# Patient Record
Sex: FEMALE | Race: WHITE | NOT HISPANIC OR LATINO | Employment: FULL TIME | URBAN - METROPOLITAN AREA
[De-identification: names, ages, dates, MRNs, and addresses within clinical notes are randomized per-mention and may not be internally consistent; named-entity substitution may affect disease eponyms.]

---

## 2021-01-24 ENCOUNTER — APPOINTMENT (OUTPATIENT)
Dept: RADIOLOGY | Facility: CLINIC | Age: 51
End: 2021-01-24
Payer: OTHER MISCELLANEOUS

## 2021-01-24 DIAGNOSIS — M25.511 ACUTE PAIN OF RIGHT SHOULDER: ICD-10-CM

## 2021-01-24 PROCEDURE — 72040 X-RAY EXAM NECK SPINE 2-3 VW: CPT

## 2021-03-12 ENCOUNTER — OFFICE VISIT (OUTPATIENT)
Dept: FAMILY MEDICINE CLINIC | Facility: CLINIC | Age: 51
End: 2021-03-12
Payer: COMMERCIAL

## 2021-03-12 VITALS
BODY MASS INDEX: 42.27 KG/M2 | WEIGHT: 263 LBS | HEIGHT: 66 IN | TEMPERATURE: 98 F | DIASTOLIC BLOOD PRESSURE: 94 MMHG | HEART RATE: 76 BPM | RESPIRATION RATE: 20 BRPM | SYSTOLIC BLOOD PRESSURE: 166 MMHG

## 2021-03-12 DIAGNOSIS — Z12.31 ENCOUNTER FOR SCREENING MAMMOGRAM FOR MALIGNANT NEOPLASM OF BREAST: ICD-10-CM

## 2021-03-12 DIAGNOSIS — M54.12 CERVICAL RADICULOPATHY: Primary | ICD-10-CM

## 2021-03-12 DIAGNOSIS — R93.89 ABNORMAL IMAGING OF THYROID: ICD-10-CM

## 2021-03-12 DIAGNOSIS — Z13.6 SCREENING FOR CARDIOVASCULAR CONDITION: ICD-10-CM

## 2021-03-12 DIAGNOSIS — D64.9 ANEMIA, UNSPECIFIED TYPE: ICD-10-CM

## 2021-03-12 DIAGNOSIS — I10 ESSENTIAL HYPERTENSION: ICD-10-CM

## 2021-03-12 PROCEDURE — 99203 OFFICE O/P NEW LOW 30 MIN: CPT | Performed by: NURSE PRACTITIONER

## 2021-03-12 PROCEDURE — 4004F PT TOBACCO SCREEN RCVD TLK: CPT | Performed by: NURSE PRACTITIONER

## 2021-03-12 PROCEDURE — 3725F SCREEN DEPRESSION PERFORMED: CPT | Performed by: NURSE PRACTITIONER

## 2021-03-12 PROCEDURE — 3008F BODY MASS INDEX DOCD: CPT | Performed by: NURSE PRACTITIONER

## 2021-03-12 RX ORDER — VALSARTAN 80 MG/1
80 TABLET ORAL DAILY
Qty: 30 TABLET | Refills: 3 | Status: SHIPPED | OUTPATIENT
Start: 2021-03-12 | End: 2021-04-16 | Stop reason: SDUPTHER

## 2021-03-12 RX ORDER — METHOCARBAMOL 750 MG/1
750 TABLET, FILM COATED ORAL 2 TIMES DAILY
Qty: 60 TABLET | Refills: 1 | Status: SHIPPED | OUTPATIENT
Start: 2021-03-12 | End: 2021-04-21 | Stop reason: SDUPTHER

## 2021-03-12 NOTE — ASSESSMENT & PLAN NOTE
Pt reports she had an iron deficiency in the past   Was on Iron supplementation during her pregnancies  Menstrual cycles were previously regular, and are now becoming irregular

## 2021-03-12 NOTE — ASSESSMENT & PLAN NOTE
Referral given for pain management to discuss injections  Continue with Robaxin for now    Advised Aleve may raise her BP and should be taken sparingly

## 2021-03-12 NOTE — PROGRESS NOTES
Assessment/Plan:    1  Cervical radiculopathy  Assessment & Plan:  Referral given for pain management to discuss injections  Continue with Robaxin for now  Advised Jony may raise her BP and should be taken sparingly    Orders:  -     Ambulatory referral to Pain Management; Future  -     methocarbamol (ROBAXIN) 750 mg tablet; Take 1 tablet (750 mg total) by mouth 2 (two) times a day    2  Encounter for screening mammogram for malignant neoplasm of breast  -     Mammo screening bilateral w 3d & cad; Future; Expected date: 03/12/2021    3  Abnormal imaging of thyroid  Assessment & Plan: Will check labs and thyroid US    Orders:  -     US thyroid; Future; Expected date: 03/12/2021  -     Thyroid Antibodies Panel; Future  -     TSH, 3rd generation; Future  -     T4, free; Future  -     Thyroid Antibodies Panel  -     TSH, 3rd generation  -     T4, free    4  Anemia, unspecified type  Assessment & Plan:  Pt reports she had an iron deficiency in the past   Was on Iron supplementation during her pregnancies  Menstrual cycles were previously regular, and are now becoming irregular  Orders:  -     Fe+TIBC+Zhang; Future  -     Fe+TIBC+Zhang    5  Screening for cardiovascular condition  -     Comprehensive metabolic panel; Future  -     CBC and differential; Future  -     Lipid panel; Future  -     Comprehensive metabolic panel  -     CBC and differential  -     Lipid panel    6  Essential hypertension  Assessment & Plan: Will start on Valsartan  Labs ordered to be done prior to f/u visit    Orders:  -     valsartan (DIOVAN) 80 mg tablet; Take 1 tablet (80 mg total) by mouth daily    BMI Counseling: Body mass index is 42 45 kg/m²  The BMI is above normal  Nutrition recommendations include consuming healthier snacks  Exercise recommendations include moderate physical activity 150 minutes/week  Tobacco Cessation Counseling: Tobacco cessation counseling was provided   The patient is sincerely urged to quit consumption of tobacco  She is not ready to quit tobacco  Patient refused medication  There are no Patient Instructions on file for this visit  Return in about 1 month (around 4/12/2021) for Annual physical     Subjective:      Patient ID: Vijay Rodriguez is a 52 y o  female  Chief Complaint   Patient presents with   [address]     NP  DIscuss multiple issues [facility] LPN       She had a WC injury on 1/16 involving her neck  MRI Was done and was told she had a preexisting condition and they would not treat her  She was treated with Cyclobenzaprine and Prednisone  Felt both were helpful, but felt drosy with the Cyclobenzaprine  Was then switched to the Robaxin, which has been helping as well  Also did PT , felt this made her more sore  She has paresthesias and weakness in her fingers  She reports she has never had neck pain in the past prior to her injury  She did have discectomy on her L4-5 in 2012  She denies any current low back pain  MRI with moderate to severe stenosis  Also some abnormal findings in her thyroid  History of HTN, she took Lisinopril for a very short time a few years back  BP has been elevated at recent visits, which she has been attributing to pain  The following portions of the patient's history were reviewed and updated as appropriate: allergies, current medications, past family history, past medical history, past social history, past surgical history and problem list     Review of Systems   Constitutional: Negative  Respiratory: Negative  Cardiovascular: Negative  Gastrointestinal: Negative  Musculoskeletal: Positive for neck pain  Neurological: Positive for weakness and numbness           Current Outpatient Medications   Medication Sig Dispense Refill    methocarbamol (ROBAXIN) 750 mg tablet Take 1 tablet (750 mg total) by mouth 2 (two) times a day 60 tablet 1    valsartan (DIOVAN) 80 mg tablet Take 1 tablet (80 mg total) by mouth daily 30 tablet 3     No current facility-administered medications for this visit  Objective:    /94   Pulse 76   Temp 98 °F (36 7 °C)   Resp 20   Ht 5' 6" (1 676 m)   Wt 119 kg (263 lb)   LMP 02/23/2021 (Exact Date)   BMI 42 45 kg/m²        Physical Exam  Vitals signs and nursing note reviewed  Constitutional:       Appearance: She is well-developed  Cardiovascular:      Rate and Rhythm: Normal rate and regular rhythm  Pulses: Normal pulses  Heart sounds: Normal heart sounds  No murmur  Pulmonary:      Effort: Pulmonary effort is normal       Breath sounds: Normal breath sounds  Musculoskeletal:      Cervical back: She exhibits normal range of motion and no tenderness  Skin:     General: Skin is warm and dry  Capillary Refill: Capillary refill takes less than 2 seconds  Neurological:      Mental Status: She is alert  Sensory: Sensory deficit (bilateral fingers) present     Psychiatric:         Mood and Affect: Mood normal          Behavior: Behavior normal                 [place] STEFFI Medina

## 2021-04-09 ENCOUNTER — HOSPITAL ENCOUNTER (OUTPATIENT)
Dept: RADIOLOGY | Facility: HOSPITAL | Age: 51
Discharge: HOME | End: 2021-04-09
Payer: COMMERCIAL

## 2021-04-09 DIAGNOSIS — R93.89 ABNORMAL IMAGING OF THYROID: ICD-10-CM

## 2021-04-09 DIAGNOSIS — Z12.31 ENCOUNTER FOR SCREENING MAMMOGRAM FOR MALIGNANT NEOPLASM OF BREAST: ICD-10-CM

## 2021-04-09 PROCEDURE — 77063 BREAST TOMOSYNTHESIS BI: CPT

## 2021-04-09 PROCEDURE — 77067 SCR MAMMO BI INCL CAD: CPT

## 2021-04-09 PROCEDURE — 76536 US EXAM OF HEAD AND NECK: CPT

## 2021-04-16 ENCOUNTER — OFFICE VISIT (OUTPATIENT)
Dept: FAMILY MEDICINE CLINIC | Facility: CLINIC | Age: 51
End: 2021-04-16
Payer: COMMERCIAL

## 2021-04-16 VITALS
BODY MASS INDEX: 42.75 KG/M2 | HEART RATE: 102 BPM | OXYGEN SATURATION: 99 % | DIASTOLIC BLOOD PRESSURE: 80 MMHG | TEMPERATURE: 98 F | HEIGHT: 66 IN | WEIGHT: 266 LBS | SYSTOLIC BLOOD PRESSURE: 146 MMHG | RESPIRATION RATE: 20 BRPM

## 2021-04-16 DIAGNOSIS — E04.2 MULTIPLE THYROID NODULES: ICD-10-CM

## 2021-04-16 DIAGNOSIS — R92.8 ABNORMAL MAMMOGRAM: ICD-10-CM

## 2021-04-16 DIAGNOSIS — M54.12 CERVICAL RADICULOPATHY: ICD-10-CM

## 2021-04-16 DIAGNOSIS — I10 ESSENTIAL HYPERTENSION: Primary | ICD-10-CM

## 2021-04-16 DIAGNOSIS — L30.9 DERMATITIS: ICD-10-CM

## 2021-04-16 DIAGNOSIS — B35.1 ONYCHOMYCOSIS: ICD-10-CM

## 2021-04-16 DIAGNOSIS — R93.89 ABNORMAL IMAGING OF THYROID: ICD-10-CM

## 2021-04-16 PROCEDURE — 3077F SYST BP >= 140 MM HG: CPT | Performed by: NURSE PRACTITIONER

## 2021-04-16 PROCEDURE — 3079F DIAST BP 80-89 MM HG: CPT | Performed by: NURSE PRACTITIONER

## 2021-04-16 PROCEDURE — 99214 OFFICE O/P EST MOD 30 MIN: CPT | Performed by: NURSE PRACTITIONER

## 2021-04-16 PROCEDURE — 3725F SCREEN DEPRESSION PERFORMED: CPT | Performed by: NURSE PRACTITIONER

## 2021-04-16 RX ORDER — VALSARTAN 160 MG/1
160 TABLET ORAL DAILY
Qty: 30 TABLET | Refills: 3 | Status: SHIPPED | OUTPATIENT
Start: 2021-04-16 | End: 2021-05-25 | Stop reason: SDUPTHER

## 2021-04-16 NOTE — ASSESSMENT & PLAN NOTE
Will plan to start Terbinafine, as she has failed topical treatment in the past, but will wait until she has her labs for baseline LFTs

## 2021-04-16 NOTE — PROGRESS NOTES
Assessment/Plan:      RTO 1 month with labs prior for CPE/pap    1  Essential hypertension  Assessment & Plan:  BP improved, but not at goal   Will increase Valsartan to 160mg daily  Orders:  -     valsartan (DIOVAN) 160 mg tablet; Take 1 tablet (160 mg total) by mouth daily    2  Abnormal imaging of thyroid    3  Multiple thyroid nodules  Assessment & Plan:  Referral provided for ENT to discuss biopsy/further workup    Orders:  -     Ambulatory Referral to Otolaryngology; Future    4  Dermatitis  Assessment & Plan: Will need to see derm if no improvement with topical cortisone  Orders:  -     hydrocortisone 2 5 % cream; Apply topically 2 (two) times a day    5  Onychomycosis  Assessment & Plan: Will plan to start Terbinafine, as she has failed topical treatment in the past, but will wait until she has her labs for baseline LFTs      6  Cervical radiculopathy  Assessment & Plan:  She has an appt with pain management for next week      7  Abnormal mammogram  Comments:  orders given for diagnostic mammo and US          There are no Patient Instructions on file for this visit  Return in about 1 month (around 5/16/2021) for Annual physical/pap  Subjective:      Patient ID: Serafin Martini is a 52 y o  female  Chief Complaint   Patient presents with    Follow-up     results  [id number]       Here tdoay for f/u on HTN, mammogram,   Has toenail fungues that she had had for years  She has had eczema for years  Gets this all over her body  Previously used cocoa butter in the past, which was helpful  Now using Eucerin, which helps if she uses it 2-3 time per day, but does not resolve it  Worse in the winter months, but is there all year round         The following portions of the patient's history were reviewed and updated as appropriate: allergies, current medications, past family history, past medical history, past social history, past surgical history and problem list     Review of Systems Constitutional: Negative for chills, fatigue and fever  Respiratory: Negative for cough, shortness of breath and wheezing  Cardiovascular: Negative for chest pain, palpitations and leg swelling  Gastrointestinal: Negative for abdominal pain, diarrhea, nausea and vomiting  Musculoskeletal: Positive for neck pain  Skin: Negative for rash  Neurological: Positive for numbness  Negative for dizziness and headaches  Current Outpatient Medications   Medication Sig Dispense Refill    methocarbamol (ROBAXIN) 750 mg tablet Take 1 tablet (750 mg total) by mouth 2 (two) times a day 60 tablet 1    valsartan (DIOVAN) 160 mg tablet Take 1 tablet (160 mg total) by mouth daily 30 tablet 3    hydrocortisone 2 5 % cream Apply topically 2 (two) times a day 453 g 0     No current facility-administered medications for this visit  Objective:    /80   Pulse 102   Temp 98 °F (36 7 °C)   Resp 20   Ht 5' 6" (1 676 m)   Wt 121 kg (266 lb)   LMP 04/15/2021 (Exact Date)   SpO2 99%   BMI 42 93 kg/m²        Physical Exam  Vitals signs and nursing note reviewed  Constitutional:       Appearance: She is well-developed  She is obese  HENT:      Head: Normocephalic and atraumatic  Right Ear: Tympanic membrane, ear canal and external ear normal       Left Ear: Tympanic membrane, ear canal and external ear normal       Nose: No mucosal edema or rhinorrhea  Mouth/Throat:      Pharynx: Uvula midline  Eyes:      Conjunctiva/sclera: Conjunctivae normal    Neck:      Musculoskeletal: Neck supple  No edema  Thyroid: No thyromegaly  Cardiovascular:      Rate and Rhythm: Normal rate and regular rhythm  Pulses: Normal pulses  Heart sounds: Normal heart sounds  No murmur  Pulmonary:      Effort: Pulmonary effort is normal       Breath sounds: Normal breath sounds  Abdominal:      General: Bowel sounds are normal  There is no distension  Palpations:  There is no hepatomegaly or splenomegaly  Tenderness: There is no abdominal tenderness  Lymphadenopathy:      Cervical:      Right cervical: No superficial cervical adenopathy  Left cervical: No superficial cervical adenopathy  Skin:     General: Skin is warm and dry  Capillary Refill: Capillary refill takes less than 2 seconds  Findings: No rash  Comments: Scattered erythematous follicular dermatitis on trunk and arms  Neurological:      Mental Status: She is alert     Psychiatric:         Mood and Affect: Mood normal          Behavior: Behavior normal                 STEFFI Flood

## 2021-04-19 ENCOUNTER — TELEPHONE (OUTPATIENT)
Dept: OTOLARYNGOLOGY | Facility: CLINIC | Age: 51
End: 2021-04-19

## 2021-04-19 ENCOUNTER — CONSULT (OUTPATIENT)
Dept: PAIN MEDICINE | Facility: CLINIC | Age: 51
End: 2021-04-19
Payer: COMMERCIAL

## 2021-04-19 ENCOUNTER — TELEPHONE (OUTPATIENT)
Dept: PAIN MEDICINE | Facility: CLINIC | Age: 51
End: 2021-04-19

## 2021-04-19 VITALS
SYSTOLIC BLOOD PRESSURE: 172 MMHG | HEART RATE: 91 BPM | BODY MASS INDEX: 42.85 KG/M2 | DIASTOLIC BLOOD PRESSURE: 105 MMHG | WEIGHT: 266.6 LBS | HEIGHT: 66 IN

## 2021-04-19 DIAGNOSIS — M54.2 NECK PAIN: ICD-10-CM

## 2021-04-19 DIAGNOSIS — M48.02 CERVICAL SPINAL STENOSIS: ICD-10-CM

## 2021-04-19 DIAGNOSIS — G89.4 CHRONIC PAIN SYNDROME: Primary | ICD-10-CM

## 2021-04-19 DIAGNOSIS — M50.120 CERVICAL DISC DISORDER WITH RADICULOPATHY OF MID-CERVICAL REGION: ICD-10-CM

## 2021-04-19 PROCEDURE — 3008F BODY MASS INDEX DOCD: CPT | Performed by: ANESTHESIOLOGY

## 2021-04-19 PROCEDURE — 99244 OFF/OP CNSLTJ NEW/EST MOD 40: CPT | Performed by: ANESTHESIOLOGY

## 2021-04-19 PROCEDURE — 4004F PT TOBACCO SCREEN RCVD TLK: CPT | Performed by: ANESTHESIOLOGY

## 2021-04-19 NOTE — TELEPHONE ENCOUNTER
----- Message from Gris Flor sent at 4/19/2021  9:08 AM EDT -----  Regarding: RE: Abnormal US of Thyroid  Pt can actually see me first   I will order the biopsy, then she can see Yunior Souza after biopsy  So set her up for 2 appts  TL  ----- Message -----  From: Jake Velazquez  Sent: 4/16/2021   3:37 PM EDT  To: STEFFI Wade  Subject: Abnormal US of Thyroid                           Hi Eudelia Mass,    Can you review this patients US of the Thyroid and let me know the urgency in which she needs to be seen? Dr Yunior Souza is scheduling into June  Please let me know    Thank you for your assitance

## 2021-04-19 NOTE — PROGRESS NOTES
Assessment:  1  Chronic pain syndrome    2  Neck pain    3  Cervical disc disorder with radiculopathy of mid-cervical region    4  Cervical spinal stenosis        Plan:  Orders Placed This Encounter   Procedures    Ambulatory referral to Physical Therapy     Standing Status:   Future     Standing Expiration Date:   4/19/2022     Referral Priority:   Routine     Referral Type:   Physical Therapy     Referral Reason:   Specialty Services Required     Requested Specialty:   Physical Therapy     Number of Visits Requested:   1     Expiration Date:   4/19/2022     My impressions and treatment recommendations were discussed in detail with the patient, who verbalized understanding and had no further questions  The patient reports overall neck pain and right upper extremity radicular symptoms in the context of cervical spinal stenosis and cervical degenerative disc disease, I felt a reasonable to offer the patient a C6-C7 cervical epidural steroid injection since this could be potentially therapeutic  The procedures, its risks, and benefits were explained in detail to the patient  Risks include but are not limited to bleeding, infection, hematoma formation, abscess formation, weakness, headache, failure the pain to improve, nerve irritation or damage, and potential worsening of the pain  The patient verbalized understanding and wished to proceed with the procedure  Follow-up is planned in 4 weeks time or sooner as warranted  Discharge instructions were provided  I personally saw and examined the patient and I agree with the above discussed plan of care  History of Present Illness:    Jose Lund is a 52 y o  female who presents to [facility] for initial evaluation of the above stated pain complaints  The patient has a past medical and chronic pain history as outlined in the assessment section  She was referred by STEFFI Wen      The patient reports pain primarily in her neck with radiation to her right upper extremity to about the level of her right hand  She is reporting numbness and tingling in all 5 digits of her right hand  She describes her pain as moderate to severe and 5/10 on the verbal numerical pain rating scale  Her pain is nearly constant in nature without any typical pattern  She describes her pain as burning, numbness, pins and needles, pressure like, dull/ aching  She is reporting weakness in her upper extremities  She does not ambulate with any assistive devices  Lying down and relaxation decreases pain  Standing, bending, exercise, coughing, and sneezing increases pain  Traction therapy, physical therapy, and heat/ ice treatment all provided no pain relief  The patient is a 1 pack per day smoker for the past 30 years  She also smokes marijuana daily for sleep  The patient does use prednisone which provided her relief in the past   She also uses ibuprofen currently as well as a lidocaine patch currently which do provide mild pain relief    Review of Systems:    Review of Systems   Constitutional: Negative for fever and unexpected weight change  HENT: Negative for trouble swallowing  Eyes: Positive for visual disturbance  Respiratory: Negative for shortness of breath and wheezing  Cardiovascular: Negative for chest pain and palpitations  Gastrointestinal: Negative for constipation, diarrhea, nausea and vomiting  Endocrine: Negative for cold intolerance, heat intolerance and polydipsia  Genitourinary: Negative for difficulty urinating and frequency  Musculoskeletal: Positive for arthralgias, myalgias and neck pain  Negative for gait problem and joint swelling  Skin: Negative for rash  Neurological: Positive for dizziness and headaches  Negative for seizures, syncope and weakness  Hematological: Does not bruise/bleed easily  Psychiatric/Behavioral: Negative for dysphoric mood  The patient is nervous/anxious      All other systems reviewed and are negative  Patient Active Problem List   Diagnosis    Cervical radiculopathy    Abnormal imaging of thyroid    Anemia    Essential hypertension    Multiple thyroid nodules    Onychomycosis    Dermatitis    Cervical spinal stenosis    Neck pain    Chronic pain syndrome       History reviewed  No pertinent past medical history      Past Surgical History:   Procedure Laterality Date    APPENDECTOMY       SECTION      LUMBAR DISCECTOMY      TUBAL LIGATION         Family History   Problem Relation Age of Onset    Diabetes Mother     Heart disease Mother     Heart Valve Disease Mother     Atrial fibrillation Mother     Hypertension Mother     Heart disease Father     Hypertension Father     Penile cancer Father     Thyroid disease Sister     Diabetes Paternal Aunt        Social History     Occupational History    Not on file   Tobacco Use    Smoking status: Current Every Day Smoker     Packs/day: 0 50     Years: 33 00     Pack years: 16 50     Types: Cigarettes     Start date: 1987    Smokeless tobacco: Never Used   Substance and Sexual Activity    Alcohol use: Never     Frequency: Never    Drug use: Yes     Types: Marijuana     Comment: medical     Sexual activity: Not on file         Current Outpatient Medications:     hydrocortisone 2 5 % cream, Apply topically 2 (two) times a day, Disp: 453 g, Rfl: 0    methocarbamol (ROBAXIN) 750 mg tablet, Take 1 tablet (750 mg total) by mouth 2 (two) times a day, Disp: 60 tablet, Rfl: 1    valsartan (DIOVAN) 160 mg tablet, Take 1 tablet (160 mg total) by mouth daily, Disp: 30 tablet, Rfl: 3    Allergies   Allergen Reactions    Penicillins Anaphylaxis    Aspirin Other (See Comments)     Nose bleeds       Physical Exam:    BP (!) 172/105 Comment: Pt anxious; MD aware  Pulse 91   Ht 5' 6" (1 676 m)   Wt 121 kg (266 lb 9 6 oz)   LMP 04/15/2021 (Exact Date)   BMI 43 03 kg/m²     Constitutional: obese  Eyes: anicteric  HEENT: grossly intact  Neck: supple, symmetric, trachea midline and no masses   Pulmonary:even and unlabored  Cardiovascular:No edema or pitting edema present  Skin:Normal without rashes or lesions and well hydrated  Psychiatric:Mood and affect appropriate  Neurologic:Cranial Nerves II-XII grossly intact  Musculoskeletal:normal    Imaging    XR CERVICAL SPINE 1/24/2021    Study Result    CERVICAL SPINE     INDICATION:   [id number]: Pain in right shoulder  lifting injury, ( 1carton of milk), pain r shoulder      COMPARISON:  None     VIEWS:  XR SPINE CERVICAL 2 OR 3 VW INJURY         FINDINGS:     No fracture or subluxation       Straightening of the usual lordosis      Mild loss of disc height at C5-C6  Mild endplate osteophyte formation anteriorly at C5-C6  Mild endplate osteophyte formation anteriorly C6-C7       The prevertebral soft tissues are within normal limits        The lung apices are clear      IMPRESSION:     1  There is nonspecific straightening of the cervical lordosis without subluxation  2   Mild spondylosis    3   No acute fracture or subluxation

## 2021-04-19 NOTE — H&P (VIEW-ONLY)
Assessment:  1  Chronic pain syndrome    2  Neck pain    3  Cervical disc disorder with radiculopathy of mid-cervical region    4  Cervical spinal stenosis        Plan:  Orders Placed This Encounter   Procedures    Ambulatory referral to Physical Therapy     Standing Status:   Future     Standing Expiration Date:   4/19/2022     Referral Priority:   Routine     Referral Type:   Physical Therapy     Referral Reason:   Specialty Services Required     Requested Specialty:   Physical Therapy     Number of Visits Requested:   1     Expiration Date:   4/19/2022     My impressions and treatment recommendations were discussed in detail with the patient, who verbalized understanding and had no further questions  The patient reports overall neck pain and right upper extremity radicular symptoms in the context of cervical spinal stenosis and cervical degenerative disc disease, I felt a reasonable to offer the patient a C6-C7 cervical epidural steroid injection since this could be potentially therapeutic  The procedures, its risks, and benefits were explained in detail to the patient  Risks include but are not limited to bleeding, infection, hematoma formation, abscess formation, weakness, headache, failure the pain to improve, nerve irritation or damage, and potential worsening of the pain  The patient verbalized understanding and wished to proceed with the procedure  Follow-up is planned in 4 weeks time or sooner as warranted  Discharge instructions were provided  I personally saw and examined the patient and I agree with the above discussed plan of care  History of Present Illness:    Martha Carver is a 52 y o  female who presents to [facility] for initial evaluation of the above stated pain complaints  The patient has a past medical and chronic pain history as outlined in the assessment section  She was referred by STEFFI Cleary      The patient reports pain primarily in her neck with radiation to her right upper extremity to about the level of her right hand  She is reporting numbness and tingling in all 5 digits of her right hand  She describes her pain as moderate to severe and 5/10 on the verbal numerical pain rating scale  Her pain is nearly constant in nature without any typical pattern  She describes her pain as burning, numbness, pins and needles, pressure like, dull/ aching  She is reporting weakness in her upper extremities  She does not ambulate with any assistive devices  Lying down and relaxation decreases pain  Standing, bending, exercise, coughing, and sneezing increases pain  Traction therapy, physical therapy, and heat/ ice treatment all provided no pain relief  The patient is a 1 pack per day smoker for the past 30 years  She also smokes marijuana daily for sleep  The patient does use prednisone which provided her relief in the past   She also uses ibuprofen currently as well as a lidocaine patch currently which do provide mild pain relief    Review of Systems:    Review of Systems   Constitutional: Negative for fever and unexpected weight change  HENT: Negative for trouble swallowing  Eyes: Positive for visual disturbance  Respiratory: Negative for shortness of breath and wheezing  Cardiovascular: Negative for chest pain and palpitations  Gastrointestinal: Negative for constipation, diarrhea, nausea and vomiting  Endocrine: Negative for cold intolerance, heat intolerance and polydipsia  Genitourinary: Negative for difficulty urinating and frequency  Musculoskeletal: Positive for arthralgias, myalgias and neck pain  Negative for gait problem and joint swelling  Skin: Negative for rash  Neurological: Positive for dizziness and headaches  Negative for seizures, syncope and weakness  Hematological: Does not bruise/bleed easily  Psychiatric/Behavioral: Negative for dysphoric mood  The patient is nervous/anxious      All other systems reviewed and are negative  Patient Active Problem List   Diagnosis    Cervical radiculopathy    Abnormal imaging of thyroid    Anemia    Essential hypertension    Multiple thyroid nodules    Onychomycosis    Dermatitis    Cervical spinal stenosis    Neck pain    Chronic pain syndrome       History reviewed  No pertinent past medical history      Past Surgical History:   Procedure Laterality Date    APPENDECTOMY       SECTION      LUMBAR DISCECTOMY      TUBAL LIGATION         Family History   Problem Relation Age of Onset    Diabetes Mother     Heart disease Mother     Heart Valve Disease Mother     Atrial fibrillation Mother     Hypertension Mother     Heart disease Father     Hypertension Father     Penile cancer Father     Thyroid disease Sister     Diabetes Paternal Aunt        Social History     Occupational History    Not on file   Tobacco Use    Smoking status: Current Every Day Smoker     Packs/day: 0 50     Years: 33 00     Pack years: 16 50     Types: Cigarettes     Start date: 1987    Smokeless tobacco: Never Used   Substance and Sexual Activity    Alcohol use: Never     Frequency: Never    Drug use: Yes     Types: Marijuana     Comment: medical     Sexual activity: Not on file         Current Outpatient Medications:     hydrocortisone 2 5 % cream, Apply topically 2 (two) times a day, Disp: 453 g, Rfl: 0    methocarbamol (ROBAXIN) 750 mg tablet, Take 1 tablet (750 mg total) by mouth 2 (two) times a day, Disp: 60 tablet, Rfl: 1    valsartan (DIOVAN) 160 mg tablet, Take 1 tablet (160 mg total) by mouth daily, Disp: 30 tablet, Rfl: 3    Allergies   Allergen Reactions    Penicillins Anaphylaxis    Aspirin Other (See Comments)     Nose bleeds       Physical Exam:    BP (!) 172/105 Comment: Pt anxious; MD aware  Pulse 91   Ht 5' 6" (1 676 m)   Wt 121 kg (266 lb 9 6 oz)   LMP 04/15/2021 (Exact Date)   BMI 43 03 kg/m²     Constitutional: obese  Eyes: anicteric  HEENT: grossly intact  Neck: supple, symmetric, trachea midline and no masses   Pulmonary:even and unlabored  Cardiovascular:No edema or pitting edema present  Skin:Normal without rashes or lesions and well hydrated  Psychiatric:Mood and affect appropriate  Neurologic:Cranial Nerves II-XII grossly intact  Musculoskeletal:normal    Imaging    XR CERVICAL SPINE 1/24/2021    Study Result    CERVICAL SPINE     INDICATION:   M25 511: Pain in right shoulder  lifting injury, ( 1carton of milk), pain r shoulder      COMPARISON:  None     VIEWS:  XR SPINE CERVICAL 2 OR 3 VW INJURY         FINDINGS:     No fracture or subluxation       Straightening of the usual lordosis      Mild loss of disc height at C5-C6  Mild endplate osteophyte formation anteriorly at C5-C6  Mild endplate osteophyte formation anteriorly C6-C7       The prevertebral soft tissues are within normal limits        The lung apices are clear      IMPRESSION:     1  There is nonspecific straightening of the cervical lordosis without subluxation  2   Mild spondylosis    3   No acute fracture or subluxation

## 2021-04-19 NOTE — TELEPHONE ENCOUNTER
Scheduled patient for 4/30/21  Patient denies RX blood thinners/ NSAIDS  Nothing to eat or drink 1 hour prior to procedure  Needs to arrange transportation  Proper clothing for procedure  No vaccines 2 weeks prior or after procedure  If ill or place on antibiotics, please call to reschedule    COVID test be to done on 4/24/21 at [facility]

## 2021-04-20 LAB
ALBUMIN SERPL-MCNC: 4.5 G/DL (ref 3.8–4.8)
ALBUMIN/GLOB SERPL: 1.7 {RATIO} (ref 1.2–2.2)
ALP SERPL-CCNC: 80 IU/L (ref 39–117)
ALT SERPL-CCNC: 21 IU/L (ref 0–32)
AST SERPL-CCNC: 22 IU/L (ref 0–40)
BASOPHILS # BLD AUTO: 0.1 X10E3/UL (ref 0–0.2)
BASOPHILS NFR BLD AUTO: 1 %
BILIRUB SERPL-MCNC: <0.2 MG/DL (ref 0–1.2)
BUN SERPL-MCNC: 16 MG/DL (ref 6–24)
BUN/CREAT SERPL: 18 (ref 9–23)
CALCIUM SERPL-MCNC: 10.1 MG/DL (ref 8.7–10.2)
CHLORIDE SERPL-SCNC: 102 MMOL/L (ref 96–106)
CHOLEST SERPL-MCNC: 187 MG/DL (ref 100–199)
CHOLEST/HDLC SERPL: 2.6 RATIO (ref 0–4.4)
CO2 SERPL-SCNC: 24 MMOL/L (ref 20–29)
CREAT SERPL-MCNC: 0.91 MG/DL (ref 0.57–1)
EOSINOPHIL # BLD AUTO: 0.3 X10E3/UL (ref 0–0.4)
EOSINOPHIL NFR BLD AUTO: 3 %
ERYTHROCYTE [DISTWIDTH] IN BLOOD BY AUTOMATED COUNT: 13.3 % (ref 11.7–15.4)
FERRITIN SERPL-MCNC: 54 NG/ML (ref 15–150)
GLOBULIN SER-MCNC: 2.7 G/DL (ref 1.5–4.5)
GLUCOSE SERPL-MCNC: 91 MG/DL (ref 65–99)
HCT VFR BLD AUTO: 42.7 % (ref 34–46.6)
HDLC SERPL-MCNC: 72 MG/DL
HGB BLD-MCNC: 13.7 G/DL (ref 11.1–15.9)
IMM GRANULOCYTES # BLD: 0 X10E3/UL (ref 0–0.1)
IMM GRANULOCYTES NFR BLD: 0 %
IRON SATN MFR SERPL: 11 % (ref 15–55)
IRON SERPL-MCNC: 39 UG/DL (ref 27–159)
LDLC SERPL CALC-MCNC: 95 MG/DL (ref 0–99)
LYMPHOCYTES # BLD AUTO: 3 X10E3/UL (ref 0.7–3.1)
LYMPHOCYTES NFR BLD AUTO: 28 %
MCH RBC QN AUTO: 29.7 PG (ref 26.6–33)
MCHC RBC AUTO-ENTMCNC: 32.1 G/DL (ref 31.5–35.7)
MCV RBC AUTO: 92 FL (ref 79–97)
MICRODELETION SYND BLD/T FISH: NORMAL
MONOCYTES # BLD AUTO: 0.7 X10E3/UL (ref 0.1–0.9)
MONOCYTES NFR BLD AUTO: 6 %
NEUTROPHILS # BLD AUTO: 6.7 X10E3/UL (ref 1.4–7)
NEUTROPHILS NFR BLD AUTO: 62 %
PLATELET # BLD AUTO: 265 X10E3/UL (ref 150–450)
POTASSIUM SERPL-SCNC: 4.8 MMOL/L (ref 3.5–5.2)
PROT SERPL-MCNC: 7.2 G/DL (ref 6–8.5)
RBC # BLD AUTO: 4.62 X10E6/UL (ref 3.77–5.28)
SL AMB EGFR AFRICAN AMERICAN: 86 ML/MIN/1.73
SL AMB EGFR NON AFRICAN AMERICAN: 75 ML/MIN/1.73
SL AMB VLDL CHOLESTEROL CALC: 20 MG/DL (ref 5–40)
SODIUM SERPL-SCNC: 141 MMOL/L (ref 134–144)
T4 FREE SERPL-MCNC: 1.19 NG/DL (ref 0.82–1.77)
THYROGLOB AB SERPL-ACNC: <1 IU/ML (ref 0–0.9)
THYROPEROXIDASE AB SERPL-ACNC: 11 IU/ML (ref 0–34)
TIBC SERPL-MCNC: 351 UG/DL (ref 250–450)
TRIGL SERPL-MCNC: 112 MG/DL (ref 0–149)
TSH SERPL DL<=0.005 MIU/L-ACNC: 1.19 UIU/ML (ref 0.45–4.5)
UIBC SERPL-MCNC: 312 UG/DL (ref 131–425)
WBC # BLD AUTO: 10.8 X10E3/UL (ref 3.4–10.8)

## 2021-04-21 ENCOUNTER — PATIENT MESSAGE (OUTPATIENT)
Dept: FAMILY MEDICINE CLINIC | Facility: CLINIC | Age: 51
End: 2021-04-21

## 2021-04-21 DIAGNOSIS — B35.1 ONYCHOMYCOSIS: Primary | ICD-10-CM

## 2021-04-21 DIAGNOSIS — M54.12 CERVICAL RADICULOPATHY: ICD-10-CM

## 2021-04-21 RX ORDER — TERBINAFINE HYDROCHLORIDE 250 MG/1
250 TABLET ORAL DAILY
Qty: 30 TABLET | Refills: 2 | Status: SHIPPED | OUTPATIENT
Start: 2021-04-21 | End: 2021-05-21

## 2021-04-21 RX ORDER — METHOCARBAMOL 750 MG/1
750 TABLET, FILM COATED ORAL 2 TIMES DAILY
Qty: 60 TABLET | Refills: 0 | Status: SHIPPED | OUTPATIENT
Start: 2021-04-21 | End: 2021-05-25 | Stop reason: SDUPTHER

## 2021-04-21 NOTE — TELEPHONE ENCOUNTER
From: Karina Gutierrez  To: STEFFI Blanchard  Sent: 4/21/2021 11:55 AM EDT  Subject: Prescription Question    I'm so happy about my blood work results, I was a little worried  Could you possibly send a script to the pharmacy for the muscle relaxer and also now that I did my blood work could I start the script for the nail fungus as well?

## 2021-04-24 DIAGNOSIS — Z11.59 SPECIAL SCREENING EXAMINATION FOR UNSPECIFIED VIRAL DISEASE: ICD-10-CM

## 2021-04-24 PROCEDURE — U0003 INFECTIOUS AGENT DETECTION BY NUCLEIC ACID (DNA OR RNA); SEVERE ACUTE RESPIRATORY SYNDROME CORONAVIRUS 2 (SARS-COV-2) (CORONAVIRUS DISEASE [COVID-19]), AMPLIFIED PROBE TECHNIQUE, MAKING USE OF HIGH THROUGHPUT TECHNOLOGIES AS DESCRIBED BY CMS-2020-01-R: HCPCS

## 2021-04-24 PROCEDURE — U0005 INFEC AGEN DETEC AMPLI PROBE: HCPCS

## 2021-04-25 LAB — SARS-COV-2 RNA RESP QL NAA+PROBE: NEGATIVE

## 2021-04-29 ENCOUNTER — HOSPITAL ENCOUNTER (OUTPATIENT)
Dept: RADIOLOGY | Facility: HOSPITAL | Age: 51
Discharge: HOME | End: 2021-04-29
Payer: COMMERCIAL

## 2021-04-29 VITALS — BODY MASS INDEX: 42.75 KG/M2 | HEIGHT: 66 IN | WEIGHT: 266 LBS

## 2021-04-29 DIAGNOSIS — R92.8 ABNORMAL SCREENING MAMMOGRAM: ICD-10-CM

## 2021-04-29 PROCEDURE — 76642 ULTRASOUND BREAST LIMITED: CPT

## 2021-04-29 PROCEDURE — 77065 DX MAMMO INCL CAD UNI: CPT

## 2021-04-29 PROCEDURE — G0279 TOMOSYNTHESIS, MAMMO: HCPCS

## 2021-04-30 ENCOUNTER — HOSPITAL ENCOUNTER (OUTPATIENT)
Facility: AMBULARY SURGERY CENTER | Age: 51
Setting detail: OUTPATIENT SURGERY
Discharge: HOME | End: 2021-04-30
Attending: ANESTHESIOLOGY | Admitting: ANESTHESIOLOGY
Payer: COMMERCIAL

## 2021-04-30 ENCOUNTER — APPOINTMENT (OUTPATIENT)
Dept: RADIOLOGY | Facility: HOSPITAL | Age: 51
End: 2021-04-30
Payer: COMMERCIAL

## 2021-04-30 VITALS
HEART RATE: 84 BPM | TEMPERATURE: 97.2 F | RESPIRATION RATE: 18 BRPM | OXYGEN SATURATION: 98 % | DIASTOLIC BLOOD PRESSURE: 97 MMHG | SYSTOLIC BLOOD PRESSURE: 177 MMHG

## 2021-04-30 PROCEDURE — 62321 NJX INTERLAMINAR CRV/THRC: CPT | Performed by: ANESTHESIOLOGY

## 2021-04-30 PROCEDURE — 72020 X-RAY EXAM OF SPINE 1 VIEW: CPT

## 2021-04-30 RX ORDER — METHYLPREDNISOLONE ACETATE 80 MG/ML
INJECTION, SUSPENSION INTRA-ARTICULAR; INTRALESIONAL; INTRAMUSCULAR; SOFT TISSUE AS NEEDED
Status: DISCONTINUED | OUTPATIENT
Start: 2021-04-30 | End: 2021-04-30 | Stop reason: HOSPADM

## 2021-04-30 RX ORDER — LIDOCAINE WITH 8.4% SOD BICARB 0.9%(10ML)
SYRINGE (ML) INJECTION AS NEEDED
Status: DISCONTINUED | OUTPATIENT
Start: 2021-04-30 | End: 2021-04-30 | Stop reason: HOSPADM

## 2021-04-30 NOTE — DISCHARGE INSTRUCTIONS
Epidural Steroid Injection   WHAT YOU NEED TO KNOW:   An epidural steroid injection (MABLE) is a procedure to inject steroid medicine into the epidural space  The epidural space is between your spinal cord and vertebrae  Steroids reduce inflammation and fluid buildup in your spine that may be causing pain  You may be given pain medicine along with the steroids  ACTIVITY  · Do not drive or operate machinery today  · No strenuous activity today - bending, lifting, etc   · You may resume normal activites starting tomorrow - start slowly and as tolerated  · You may shower today, but no tub baths or hot tubs  · You may have numbness for several hours from the local anesthetic  Please use caution and common sense, especially with weight-bearing activities  CARE OF THE INJECTION SITE  · If you have soreness or pain, apply ice to the area today (20 minutes on/20 minutes off)  · Starting tomorrow, you may use warm, moist heat or ice if needed  · You may have an increase or change in your discomfort for 36-48 hours after your treatment  · Apply ice and continue with any pain medication you have been prescribed  · Notify [facility] if you have any of the following: redness, drainage, swelling, headache, stiff neck or fever above 100°F     SPECIAL INSTRUCTIONS  · Our office will contact you in approximately 7 days for a progress report  MEDICATIONS  · Continue to take all routine medications  · Our office may have instructed you to hold some medications  As no general anesthesia was used in today's procedure, you should not experience any side effects related to anesthesia  If you have a problem specifically related to your procedure, please call our office at [phone]  Problems not related to your procedure should be directed to your primary care physician

## 2021-04-30 NOTE — INTERVAL H&P NOTE
H&P reviewed  After examining the patient I find no changes in the patients condition since the H&P had been written      Vitals:    04/30/21 0855   BP: (!) 177/97   Pulse: 99   Resp: 18   Temp: (!) 97 2 °F (36 2 °C)   SpO2: 98%

## 2021-04-30 NOTE — OP NOTE
ATTENDING PHYSICIAN:  Jacinto Smith MD     PROCEDURE:  Cervical epidural steroid injection with steroid and local anesthetic under fluoroscopy at the C6-C7 level  PREPROCEDURE DIAGNOSIS:   Neck pain and upper extremity radicular symptoms  POSTPROCEDURE DIAGNOSIS:   Neck pain and upper extremity radicular symptoms  ANESTHESIA:  Local     ESTIMATED BLOOD LOSS:  Minimal     COMPLICATIONS:  None  LOCATION:  [address]  CONSENT:  Today's procedure, its potential benefits as well as its risks and potential side effects were reviewed  Discussed risks of the procedure including bleeding, infection, nerve irritation or damage, reactions to the medications, headache, failure of the pain to improve, and potential worsening of the pain were explained to the patient who verbalized understanding and who wished to proceed  Written informed consent is thereby obtained  DESCRIPTION OF THE PROCEDURE:  After written informed consent was obtained, the patient was taken to the fluoroscopy suite and placed in the prone position  Anatomical landmarks were identified by way of fluoroscopy in multiple views  The skin of the cervical region was prepped and draped in the usual sterile fashion  Strict aseptic technique was utilized  The skin and subcutaneous tissues at the needle entry site were infiltrated with 3 mL of 1% preservative-free lidocaine using a 25-gauge 1-1/2-inch needle  A 20-gauge Tuohy needle was then incrementally advanced under fluoroscopy using a loss of resistance technique  Upon entering into the epidural space, a positive loss of resistance to air was noted and a characteristic "pop" was felt  Proper placement into the epidural space was confirmed with a hanging column technique where preservative-free normal saline was noted to flow freely to gravity in to the epidural space as well as by the administration of contrast to delineate the epidural space   There were no paresthesias reported  After negative aspiration for CSF or heme, a 6 mL injectate consisting of 1 mL of Depo-Medrol 80 mg/mL and 1 mL of Depo-Medrol 40 mg/mL mixed with 4 mL of preservative-free normal saline was slowly injected  The patient tolerated the procedure well and all needles were removed with the tips intact  Hemostasis was maintained  There were no apparent paresthesias or complications  The skin was wiped clean and a Band-Aid was placed as appropriate  The patient was monitored for an appropriate period of time following the procedure and remained hemodynamically stable and neurovascularly intact following the procedure  The patient was ultimately discharged to home with supervision in good condition and instructed to call the office in a few days for an update or sooner as warranted  I was present for and participated in all key and critical portions of this procedure      Vanna Melissa MD  4/30/2021  9:49 AM

## 2021-05-07 ENCOUNTER — TELEPHONE (OUTPATIENT)
Dept: PAIN MEDICINE | Facility: CLINIC | Age: 51
End: 2021-05-07

## 2021-05-07 NOTE — TELEPHONE ENCOUNTER
Pt reports 20% improvement post inj   Pain level 6/10   Pt aware I will call next week for an update

## 2021-05-11 ENCOUNTER — EVALUATION (OUTPATIENT)
Dept: PHYSICAL THERAPY | Facility: CLINIC | Age: 51
End: 2021-05-11
Payer: COMMERCIAL

## 2021-05-11 DIAGNOSIS — M50.120 CERVICAL DISC DISORDER WITH RADICULOPATHY OF MID-CERVICAL REGION: Primary | ICD-10-CM

## 2021-05-11 DIAGNOSIS — M54.2 NECK PAIN: ICD-10-CM

## 2021-05-11 DIAGNOSIS — G89.4 CHRONIC PAIN SYNDROME: ICD-10-CM

## 2021-05-11 DIAGNOSIS — M48.02 CERVICAL SPINAL STENOSIS: ICD-10-CM

## 2021-05-11 PROCEDURE — 97161 PT EVAL LOW COMPLEX 20 MIN: CPT

## 2021-05-11 NOTE — PROGRESS NOTES
PT Evaluation     Today's date: 2021  Patient name: Tristan Yee  : 1970  MRN: [id number]  Referring provider: Malik Merritt MD  Dx:   Encounter Diagnosis     ICD-10-CM    1  Cervical disc disorder with radiculopathy of mid-cervical region  M50 120 Ambulatory referral to Physical Therapy   2  Chronic pain syndrome  G89 4 Ambulatory referral to Physical Therapy   3  Neck pain  M54 2 Ambulatory referral to Physical Therapy   4  Cervical spinal stenosis  M48 02 Ambulatory referral to Physical Therapy       Start Time: 1530  Stop Time: 1615  Total time in clinic (min): 45 minutes    Assessment  Assessment details: Tristan Yee is a 52y o  year old female reports to physical therapy with symptoms consistent with referring diagnosis of: Cervical disc disorder with radiculopathy of mid-cervical region  (primary encounter diagnosis), Chronic pain syndrome, Neck pain, Cervical spinal stenosis (Plan: Ambulatory referral to Physical Therapy)  Patient states she occurred an injury at work in 2021 when she was reaching for packages at the [employer]  Since then, she has had increased pain of her cervical spine and B UE, with no relief from formal PT intervention and steroid injections  During evaluation, patient demonstrates severe limitations in cervical spine, with weakness of L UE during myotome testing, and sensation chances of R UE  Repeated motion testing abolished symptoms of cervical spine during testing  Patient demonstrates limitations in cervical spine ROM, strength, and functional mobility  Patient is limited in the following functional activities: lifting boxes, overhead mobility, and turning head for driving  FOTO score is 56% with a 68% prediction in function        Patient requires skilled physical therapy to restore prior level of function, address functional limitations, and progress towards independence with home exercise program  Patient was educated on nature of cervical radiculopathy and centralization phenomenon, HEP as indicated on flow sheet, and postural awareness  Patient verbalized and demonstrated understanding of HEP and plan of care  Symptom irritability: moderate  Goals  STGs to be achieved in 4 weeks  -STG 1: Patient will be independent with HEP    -STG 2: Patient will have 0/10 cervical spine pain at rest    -STG 3: Patient will increase cervical spine ROM to within normal limits  -STG 4: Patient will report 40% functional improvement  -STG 5: Patient will demonstrate 1/2 grade MMT improvement in B UE    -STG 6: Patient will be able to demonstrate proper lifting mechanics with no cervical spine pain  LTGs to be achieved in 8 weeks  -LTG 1: Patient will demonstrate independence with program    -LTG 2: Patient will perform all functional activities with less than 2/10 cervical spine pain  -LTG 3: Patient will improve FOTO score by 10 points  -LTG 4: Patient will report 80% functional improvement  -LTG 5: Patient will demonstrate improved posture for avoidance of recurrent pain in the future  -LTG 6: Patient will demonstrate 1 grade MMT improvement in B UE         Plan  Patient would benefit from: PT eval and skilled physical therapy  Planned modality interventions: TENS, thermotherapy: hydrocollator packs, cryotherapy, electrical stimulation/Russian stimulation, traction and ultrasound  Planned therapy interventions: joint mobilization, manual therapy, massage, neuromuscular re-education, patient education, activity modification, ADL retraining, ADL training, postural training, strengthening, stretching, therapeutic activities, therapeutic exercise, flexibility, functional ROM exercises, gait training, graded activity, graded exercise, graded motor, home exercise program and therapeutic training  Frequency: 3x week  Duration in weeks: 8  Treatment plan discussed with: patient        Subjective Evaluation    History of Present Illness  Date of onset: 2021  Mechanism of injury: trauma  Mechanism of injury: Patient states she had an injury at work on 2021  Patient thought she "pulled a muscle," but found out with imaging that she had DDD and several disc herniations after injury  She went through [facility], where she had PT intervention, with little to no relief  She then had to see pain management, where she is no longer on Worker's Comp  Patient had injection of cervical spine on 2021  She states that this did not help her  Functionally, patient reports difficulties with performing work related activities at [email or web address], turning her head to the L when driving, performing cervical flexion and extension, and brushing her hair  She reports numbness of R hand and fingers  She states that her numbness is in her L UE at times, but is much worse in her R UE  She was on a steroid pack initially, which helped dissipate her pain and numbness  Patient reports 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, and when she is rising from a seated position  She also reports headaches 2-3 x/week, with no relief of symptoms     Pain  Current pain ratin  At best pain ratin  At worst pain ratin  Quality: knife-like, needle-like and radiating  Relieving factors: relaxation, rest and medications  Aggravating factors: overhead activity and lifting  Progression: no change    Social Support  Steps to enter house: yes  Stairs in house: yes   Lives in: multiple-level home  Lives with: spouse    Employment status: working  Hand dominance: right      Diagnostic Tests  X-ray: abnormal  MRI studies: abnormal  Treatments  Previous treatment: physical therapy  Current treatment: medication  Patient Goals  Patient goals for therapy: increased motion and decreased pain          Objective     Postural Observations  Seated posture: fair  Standing posture: fair  Correction of posture: makes symptoms better        Palpation   Left Tenderness of the cervical paraspinals, suboccipitals and upper trapezius  Trigger point to suboccipitals and upper trapezius  Right   Tenderness of the cervical paraspinals, suboccipitals and upper trapezius  Trigger point to suboccipitals and upper trapezius  Tenderness   Cervical Spine   Tenderness in the spinous process (C6-C7)       Neurological Testing     Sensation   Cervical/Thoracic   Left   Intact: light touch    Right   Diminished: light touch    Strength/Myotome Testing   Cervical Spine     Left   Normal strength    Right   Interossei strength (t1): 5    Right Shoulder     Planes of Motion   Flexion: 5   Abduction: 5   External rotation at 0°: 5     Isolated Muscles   Upper trapezius: 5     Right Elbow   Flexion: 5  Extension: 4-    Right Wrist/Hand   Wrist extension: 5  Wrist flexion: 5    General Comments:      Cervical/Thoracic Comments  Cervical AROM   Flexion: 75%, pulling    Extension: 100%, pressure    R rotation: 75%, pulling    L rotation: 50%    R side bendin%, pain    L side bendin%, pain     Repeated motions testing:    Repeated retractions in seated: no effect     Repeated protractions: no effect   Repeated retraction, extension: increased, no worse    Repeated retractions in supine: decreased, abolished              Precautions: Standard, Essential HTN, work related injury 2021      Manuals 21       Cervical distraction        L UT stretch         Nerve glides                Neuro Re-Ed        Scap retract                                                         Ther Ex        Repeated cervical retraction supine 10       L UT stretch         TB row, shoulder ext        Horizontal abd                                                                                        Modalities

## 2021-05-13 ENCOUNTER — OFFICE VISIT (OUTPATIENT)
Dept: PHYSICAL THERAPY | Facility: CLINIC | Age: 51
End: 2021-05-13
Payer: COMMERCIAL

## 2021-05-13 DIAGNOSIS — M48.02 CERVICAL SPINAL STENOSIS: ICD-10-CM

## 2021-05-13 DIAGNOSIS — M54.2 NECK PAIN: ICD-10-CM

## 2021-05-13 DIAGNOSIS — G89.4 CHRONIC PAIN SYNDROME: Primary | ICD-10-CM

## 2021-05-13 DIAGNOSIS — M50.120 CERVICAL DISC DISORDER WITH RADICULOPATHY OF MID-CERVICAL REGION: ICD-10-CM

## 2021-05-13 PROCEDURE — 97110 THERAPEUTIC EXERCISES: CPT

## 2021-05-13 NOTE — PROGRESS NOTES
Daily Note     Today's date: 2021  Patient name: Roxann Peck  : 1970  MRN: [id number]  Referring provider: Neela Elder MD  Dx:   Encounter Diagnosis     ICD-10-CM    1  Chronic pain syndrome  G89 4    2  Neck pain  M54 2    3  Cervical disc disorder with radiculopathy of mid-cervical region  M50 120    4  Cervical spinal stenosis  M48 02        Start Time: 915  Stop Time: 930  Total time in clinic (min): 15 minutes    Subjective: Patient reports 3/10 pain of cervical spine today  She had a "horrible" night due to her pain  She took a pain medication due to her pain levels  This morning, she notes increased suboccipital pain  Objective: See treatment diary below      Assessment: Patient's treatment session cut short due to patient request  Patient's pain abolished in suboccipitals with sustained protrusion  Repeated cervical retractions abolished lower cervical spine pain this morning  Patient encouraged to perform exercises once an hour to maximize benefits and centralize pain  Verbalized understanding  Tolerated treatment well  Patient would benefit from continued PT to maximize function for independence in community  Plan: Continue per plan of care        Precautions: Standard, Essential HTN, work related injury 2021      Manuals 21      Cervical distraction  3'      L UT stretch         Nerve glides                Neuro Re-Ed        Scap retract   20                      Ther Ex        Repeated cervical retraction supine 10 10      L UT stretch         TB row, shoulder ext        Horizontal abd        Sustained protrusion  2x2'                                      Modalities

## 2021-05-18 ENCOUNTER — OFFICE VISIT (OUTPATIENT)
Dept: OTOLARYNGOLOGY | Facility: CLINIC | Age: 51
End: 2021-05-18
Payer: COMMERCIAL

## 2021-05-18 ENCOUNTER — OFFICE VISIT (OUTPATIENT)
Dept: PHYSICAL THERAPY | Facility: CLINIC | Age: 51
End: 2021-05-18
Payer: COMMERCIAL

## 2021-05-18 VITALS — TEMPERATURE: 98.1 F | HEIGHT: 66 IN | BODY MASS INDEX: 42.11 KG/M2 | WEIGHT: 262 LBS

## 2021-05-18 DIAGNOSIS — E04.2 MULTIPLE THYROID NODULES: Primary | ICD-10-CM

## 2021-05-18 DIAGNOSIS — G89.4 CHRONIC PAIN SYNDROME: Primary | ICD-10-CM

## 2021-05-18 DIAGNOSIS — M48.02 CERVICAL SPINAL STENOSIS: ICD-10-CM

## 2021-05-18 DIAGNOSIS — M54.2 NECK PAIN: ICD-10-CM

## 2021-05-18 DIAGNOSIS — M50.120 CERVICAL DISC DISORDER WITH RADICULOPATHY OF MID-CERVICAL REGION: ICD-10-CM

## 2021-05-18 PROCEDURE — 97110 THERAPEUTIC EXERCISES: CPT

## 2021-05-18 PROCEDURE — 99243 OFF/OP CNSLTJ NEW/EST LOW 30: CPT | Performed by: NURSE PRACTITIONER

## 2021-05-18 NOTE — ASSESSMENT & PLAN NOTE
Reviewed incidental finding of thyroid nodules on MRI  TSH level 1 190  Recent thyroid us indicating  Right lobe:  5 6 x 2 4 x 2 6 cm  Volume of 16 7 mL  Left lobe:  5 4 x 2 6 x 2 6 cm  Volume of 17 2 mL  Isthmus:  0 5 cm  RIGHT midgland nodule measuring 2 3 x 1 7 x 1 8 cm  TR 3 (3 points), FNA if >2 5 cm  Follow if >1 5 cm    LEFT midgland nodule measuring 3 3 x 2 1 x 2 0 cm  TR 3 (3 points), FNA if >2 5 cm  Follow if >1 5 cm  Discussed nature of thyroid nodules and occur in nearly 40% of women over age of 48  Discussed Booker rating system, COLEEN guidelines, and indications for further interventions  Reviewed options for treatment including repeat ultrasound in one year, repeat FNA[place] of the [place] with [facility], or surgical removal of thyroid (lobectomy versus total thyroidectomy)  Discussed option of FNAB of only left nodule as that nodule meets criteria vs FNAB of both nodules left and right (the right nodule is 0 2 cm from meeting criteria for needle biopsy)  Reviewed actual FNAB procedure with US guidance      After discussion agreed to FNAB with Afirma of both nodules (left and right)  Follow up after testing

## 2021-05-18 NOTE — PROGRESS NOTES
Daily Note     Today's date: 2021  Patient name: Carol Avila  : 1970  MRN: [id number]  Referring provider: Sally Ashby MD  Dx:   Encounter Diagnosis     ICD-10-CM    1  Chronic pain syndrome  G89 4    2  Neck pain  M54 2    3  Cervical disc disorder with radiculopathy of mid-cervical region  M50 120    4  Cervical spinal stenosis  M48 02        Start Time: 0800  Stop Time: 0840  Total time in clinic (min): 40 minutes    Subjective: Patient reports 2/10 cervical spine and suboccipital pain today  She states that she "has my moments," but overall, her pain is better throughout the course of her day  Objective: See treatment diary below      Assessment: Tolerated treatment well  Patient's pain abolished with repeated cervical retractions and posture correct  Encouraged to perform repeated cervical retractions when pain persists at home and work  Verbalized understanding  Patient would benefit from continued PT to improve postural awareness  Plan: Continue per plan of care        Precautions: Standard, Essential HTN, work related injury 2021      Manuals 21     Cervical distraction  3' 3'     L UT stretch    3'     Nerve glides                Neuro Re-Ed        Scap retract   20 20 RTB                     Ther Ex        Repeated cervical retraction supine 10 10 2x10     L UT stretch    3x10s     TB row, shoulder ext   20 RTB     Horizontal abd   20 RTB     Sustained protrusion  2x2'      L Levator stretch   3x10s                             Modalities

## 2021-05-18 NOTE — PROGRESS NOTES
Assessment/Plan:    Multiple thyroid nodules  Reviewed incidental finding of thyroid nodules on MRI  TSH level 1 190  Recent thyroid us indicating  Right lobe:  5 6 x 2 4 x 2 6 cm  Volume of 16 7 mL  Left lobe:  5 4 x 2 6 x 2 6 cm  Volume of 17 2 mL  Isthmus:  0 5 cm  RIGHT midgland nodule measuring 2 3 x 1 7 x 1 8 cm  TR 3 (3 points), FNA if >2 5 cm  Follow if >1 5 cm    LEFT midgland nodule measuring 3 3 x 2 1 x 2 0 cm  TR 3 (3 points), FNA if >2 5 cm  Follow if >1 5 cm  Discussed nature of thyroid nodules and occur in nearly 40% of women over age of 48  Discussed Fleming rating system, COLEEN guidelines, and indications for further interventions  Reviewed options for treatment including repeat ultrasound in one year, repeat FNAB of the nodule with [facility], or surgical removal of thyroid (lobectomy versus total thyroidectomy)  Discussed option of FNAB of only left nodule as that nodule meets criteria vs FNAB of both nodules left and right (the right nodule is 0 2 cm from meeting criteria for needle biopsy)  Reviewed actual FNAB procedure with US guidance  After discussion agreed to FNAB with Afirma of both nodules (left and right)  Follow up after testing         Diagnoses and all orders for this visit:    Multiple thyroid nodules  -     Ambulatory Referral to Otolaryngology  -     [place] [facility] [place]          Subjective:      Patient ID: Nena Stein is a 52 y o  female  Presents today as a new patient due to thyroid nodules  During MRI for work injury informed of incidental findingof thryoid ndoules  No mass effect  Recent thyroid US and TSh level  The following portions of the patient's history were reviewed and updated as appropriate: allergies, current medications, past family history, past medical history, past social history, past surgical history and problem list     Review of Systems   Constitutional: Negative      HENT: Negative for congestion, ear discharge, ear pain, hearing loss, nosebleeds, postnasal drip, rhinorrhea, sinus pressure, sinus pain, sore throat, tinnitus and voice change  Eyes: Negative  Respiratory: Negative for chest tightness and shortness of breath  Cardiovascular: Negative  Gastrointestinal: Negative  Endocrine: Negative  Musculoskeletal: Negative  Skin: Negative for color change  Neurological: Negative for dizziness, numbness and headaches  Psychiatric/Behavioral: Negative  Objective:      Temp 98 1 °F (36 7 °C) (Temporal)   Ht 5' 6" (1 676 m)   Wt 119 kg (262 lb)   BMI 42 29 kg/m²          Physical Exam  Constitutional:       Appearance: She is well-developed  HENT:      Head: Normocephalic  Right Ear: Hearing, tympanic membrane, ear canal and external ear normal  No decreased hearing noted  No drainage or tenderness  Tympanic membrane is not perforated or erythematous  Left Ear: Hearing, tympanic membrane, ear canal and external ear normal  No decreased hearing noted  No drainage or tenderness  Tympanic membrane is not perforated or erythematous  Nose: Nose normal  No nasal deformity or septal deviation  Mouth/Throat:      Mouth: Mucous membranes are not pale and not dry  No oral lesions  Dentition: Normal dentition  Pharynx: Uvula midline  No oropharyngeal exudate  Neck:      Musculoskeletal: Full passive range of motion without pain, normal range of motion and neck supple  Trachea: No tracheal deviation  Comments: Right thyroid fullness    Cardiovascular:      Rate and Rhythm: Normal rate  Pulmonary:      Effort: Pulmonary effort is normal  No accessory muscle usage or respiratory distress  Musculoskeletal:      Right shoulder: She exhibits normal range of motion  Lymphadenopathy:      Cervical: No cervical adenopathy  Skin:     General: Skin is warm and dry     Neurological:      Mental Status: She is alert and oriented to person, place, and time  Cranial Nerves: No cranial nerve deficit  Sensory: No sensory deficit  Psychiatric:         Behavior: Behavior is cooperative

## 2021-05-20 ENCOUNTER — APPOINTMENT (OUTPATIENT)
Dept: PHYSICAL THERAPY | Facility: CLINIC | Age: 51
End: 2021-05-20
Payer: COMMERCIAL

## 2021-05-21 ENCOUNTER — OFFICE VISIT (OUTPATIENT)
Dept: FAMILY MEDICINE CLINIC | Facility: CLINIC | Age: 51
End: 2021-05-21
Payer: COMMERCIAL

## 2021-05-21 VITALS
DIASTOLIC BLOOD PRESSURE: 74 MMHG | HEART RATE: 76 BPM | HEIGHT: 66 IN | SYSTOLIC BLOOD PRESSURE: 144 MMHG | TEMPERATURE: 98 F | BODY MASS INDEX: 41.62 KG/M2 | WEIGHT: 259 LBS | RESPIRATION RATE: 20 BRPM

## 2021-05-21 DIAGNOSIS — Z01.419 ENCOUNTER FOR CERVICAL PAP SMEAR WITH PELVIC EXAM: ICD-10-CM

## 2021-05-21 DIAGNOSIS — Z00.00 ROUTINE ADULT HEALTH MAINTENANCE: Primary | ICD-10-CM

## 2021-05-21 DIAGNOSIS — Z23 NEED FOR VACCINATION: ICD-10-CM

## 2021-05-21 DIAGNOSIS — B35.1 ONYCHOMYCOSIS: ICD-10-CM

## 2021-05-21 DIAGNOSIS — I10 ESSENTIAL HYPERTENSION: ICD-10-CM

## 2021-05-21 LAB
ALBUMIN SERPL-MCNC: 4.4 G/DL (ref 3.8–4.8)
ALBUMIN/GLOB SERPL: 1.9 {RATIO} (ref 1.2–2.2)
ALP SERPL-CCNC: 71 IU/L (ref 48–121)
ALT SERPL-CCNC: 18 IU/L (ref 0–32)
AST SERPL-CCNC: 20 IU/L (ref 0–40)
BILIRUB SERPL-MCNC: <0.2 MG/DL (ref 0–1.2)
BUN SERPL-MCNC: 18 MG/DL (ref 6–24)
BUN/CREAT SERPL: 19 (ref 9–23)
CALCIUM SERPL-MCNC: 10 MG/DL (ref 8.7–10.2)
CHLORIDE SERPL-SCNC: 105 MMOL/L (ref 96–106)
CO2 SERPL-SCNC: 23 MMOL/L (ref 20–29)
CREAT SERPL-MCNC: 0.95 MG/DL (ref 0.57–1)
GLOBULIN SER-MCNC: 2.3 G/DL (ref 1.5–4.5)
GLUCOSE SERPL-MCNC: 107 MG/DL (ref 65–99)
POTASSIUM SERPL-SCNC: 4.8 MMOL/L (ref 3.5–5.2)
PROT SERPL-MCNC: 6.7 G/DL (ref 6–8.5)
SL AMB EGFR AFRICAN AMERICAN: 82 ML/MIN/1.73
SL AMB EGFR NON AFRICAN AMERICAN: 71 ML/MIN/1.73
SODIUM SERPL-SCNC: 142 MMOL/L (ref 134–144)

## 2021-05-21 PROCEDURE — 3725F SCREEN DEPRESSION PERFORMED: CPT | Performed by: NURSE PRACTITIONER

## 2021-05-21 PROCEDURE — 4004F PT TOBACCO SCREEN RCVD TLK: CPT | Performed by: NURSE PRACTITIONER

## 2021-05-21 PROCEDURE — 3077F SYST BP >= 140 MM HG: CPT | Performed by: NURSE PRACTITIONER

## 2021-05-21 PROCEDURE — 99396 PREV VISIT EST AGE 40-64: CPT | Performed by: NURSE PRACTITIONER

## 2021-05-21 PROCEDURE — 90471 IMMUNIZATION ADMIN: CPT

## 2021-05-21 PROCEDURE — 90715 TDAP VACCINE 7 YRS/> IM: CPT

## 2021-05-21 PROCEDURE — 3078F DIAST BP <80 MM HG: CPT | Performed by: NURSE PRACTITIONER

## 2021-05-21 PROCEDURE — 3008F BODY MASS INDEX DOCD: CPT | Performed by: NURSE PRACTITIONER

## 2021-05-21 NOTE — PROGRESS NOTES
FAMILY PRACTICE HEALTH MAINTENANCE OFFICE VISIT  [facility]    NAME: Karina Gutierrez  AGE: 52 y o  SEX: female  : 1970     DATE: 2021    Assessment and Plan     1  Routine adult health maintenance    2  Encounter for cervical Pap smear with pelvic exam  -     IGP, Aptima HPV, Rfx 16/18,45    3  Essential hypertension  Assessment & Plan:  Improving on increased dose of Valsartan  Orders:  -     Comprehensive metabolic panel; Future    4  Need for vaccination  -     TDAP VACCINE GREATER THAN OR EQUAL TO 8YO IM    5  Onychomycosis  Assessment & Plan:  Order given for repeat LFTs as she is on Terbinafine        · Patient Counseling:   · Nutrition: Stressed importance of a well balanced diet, moderation of sodium/saturated fat, caloric balance and sufficient intake of fiber  · Exercise: Stressed the importance of regular exercise with a goal of 150 minutes per week  · Dental Health: Discussed daily flossing and brushing and regular dental visits     · Immunizations reviewed: See Orders  · Discussed benefits of:  Mammogram , Cervical Cancer screening and Screening labs   BMI Counseling: Body mass index is 42 12 kg/m²  Discussed with patient's BMI with her  The BMI is above normal  Exercise recommendations include moderate aerobic physical activity for 150 minutes/week  Return in about 6 months (around 2021)  Chief Complaint     Chief Complaint   Patient presents with    Well Check      CPE with Pap JMoyleLPN       History of Present Illness     Here today for CPE  Scheduled for thyroid biopsy  Rash has resolved since she started the Terbinafine         Well Adult Physical   Patient here for a comprehensive physical exam       Diet and Physical Activity  Diet: working on diet changes and portion control  Exercise: infrequently      Depression Screen  PHQ-9 Depression Screening    PHQ-9:   Frequency of the following problems over the past two weeks: Little interest or pleasure in doing things: 0 - not at all  Feeling down, depressed, or hopeless: 0 - not at all  PHQ-2 Score: 0          General Health  Hearing: Normal:  bilateral  Vision: most recent eye exam >1 year and wears glasses  Dental: no dental visits for >1 year and brushes teeth twice daily    Reproductive Health  Irregular Periods for the past 2 years   periomenopausal      The following portions of the patient's history were reviewed and updated as appropriate: allergies, current medications, past family history, past medical history, past social history, past surgical history and problem list     Review of Systems     Review of Systems   Constitutional: Negative  Respiratory: Negative  Cardiovascular: Negative  Gastrointestinal: Negative  Genitourinary: Negative  Neurological: Negative  Past Medical History     Past Medical History:   Diagnosis Date    Hypertension        Past Surgical History     Past Surgical History:   Procedure Laterality Date    APPENDECTOMY       SECTION      EPIDURAL BLOCK INJECTION N/A 2021    Procedure: C6 C7  Cervical epidural steroid injection ( 33201);   Surgeon: Juanpablo Macias MD;  Location: [facility] MAIN OR;  Service: Pain Management     LUMBAR DISCECTOMY      TUBAL LIGATION         Social History     Social History     Socioeconomic History    Marital status: Unknown     Spouse name: None    Number of children: None    Years of education: None    Highest education level: None   Occupational History    None   Social Needs    Financial resource strain: None    Food insecurity     Worry: None     Inability: None    Transportation needs     Medical: None     Non-medical: None   Tobacco Use    Smoking status: Current Every Day Smoker     Packs/day: 0 50     Years: 33 00     Pack years: 16 50     Types: Cigarettes     Start date: 1987    Smokeless tobacco: Never Used   Substance and Sexual Activity    Alcohol use: Never     Frequency: Never    Drug use: Yes     Types: Marijuana     Comment: medical     Sexual activity: None   Lifestyle    Physical activity     Days per week: None     Minutes per session: None    Stress: None   Relationships    Social connections     Talks on phone: None     Gets together: None     Attends Tenriism service: None     Active member of club or organization: None     Attends meetings of clubs or organizations: None     Relationship status: None    Intimate partner violence     Fear of current or ex partner: None     Emotionally abused: None     Physically abused: None     Forced sexual activity: None   Other Topics Concern    None   Social History Narrative    None       Family History     Family History   Problem Relation Age of Onset    Diabetes Mother     Heart disease Mother     Heart Valve Disease Mother     Atrial fibrillation Mother     Hypertension Mother     Heart disease Father     Hypertension Father     Penile cancer Father     Thyroid disease Sister     Diabetes Paternal Aunt        Current Medications       Current Outpatient Medications:     methocarbamol (ROBAXIN) 750 mg tablet, Take 1 tablet (750 mg total) by mouth 2 (two) times a day, Disp: 60 tablet, Rfl: 0    terbinafine (LamISIL) 250 mg tablet, Take 1 tablet (250 mg total) by mouth daily, Disp: 30 tablet, Rfl: 2    valsartan (DIOVAN) 160 mg tablet, Take 1 tablet (160 mg total) by mouth daily, Disp: 30 tablet, Rfl: 3     Allergies     Allergies   Allergen Reactions    Penicillins Anaphylaxis    Aspirin Other (See Comments)     Nose bleeds       Objective     /74   Pulse 76   Temp 98 °F (36 7 °C)   Resp 20   Ht 5' 5 75" (1 67 m)   Wt 117 kg (259 lb)   LMP 04/13/2021 (Exact Date)   BMI 42 12 kg/m²      Physical Exam  Vitals signs and nursing note reviewed  Constitutional:       Appearance: She is well-developed  She is obese  HENT:      Head: Normocephalic        Right Ear: External ear normal       Left Ear: External ear normal       Nose: Nose normal    Eyes:      Conjunctiva/sclera: Conjunctivae normal       Pupils: Pupils are equal, round, and reactive to light  Neck:      Musculoskeletal: Neck supple  Thyroid: No thyromegaly  Cardiovascular:      Rate and Rhythm: Normal rate and regular rhythm  Pulses: Normal pulses  Heart sounds: Normal heart sounds  No murmur  Pulmonary:      Effort: Pulmonary effort is normal       Breath sounds: Normal breath sounds  Abdominal:      General: Bowel sounds are normal  There is no distension  Palpations: Abdomen is soft  Musculoskeletal: Normal range of motion  Lymphadenopathy:      Cervical: No cervical adenopathy  Skin:     General: Skin is warm and dry  Capillary Refill: Capillary refill takes less than 2 seconds  Neurological:      Mental Status: She is alert and oriented to person, place, and time  Sensory: No sensory deficit  Coordination: Coordination normal       Deep Tendon Reflexes: Reflexes are normal and symmetric     Psychiatric:         Mood and Affect: Mood normal          Behavior: Behavior normal             Visual Acuity Screening    Right eye Left eye Both eyes   Without correction: 20/40 20/50 20/40   With correction:      Comments: She does not have her glasses with her today          Murali Rajan, [address] [facility]

## 2021-05-21 NOTE — PROGRESS NOTES
Assessment/Plan:    There are no diagnoses linked to this encounter  There are no Patient Instructions on file for this visit  No follow-ups on file  Subjective:      Patient ID: Denzel Montejo is a 52 y o  female  Chief Complaint   Patient presents with    Well Check      CPE with Pap JMoyleLPN       HPI    The following portions of the patient's history were reviewed and updated as appropriate: allergies, current medications, past family history, past medical history, past social history, past surgical history and problem list     Review of Systems      Current Outpatient Medications   Medication Sig Dispense Refill    methocarbamol (ROBAXIN) 750 mg tablet Take 1 tablet (750 mg total) by mouth 2 (two) times a day 60 tablet 0    terbinafine (LamISIL) 250 mg tablet Take 1 tablet (250 mg total) by mouth daily 30 tablet 2    valsartan (DIOVAN) 160 mg tablet Take 1 tablet (160 mg total) by mouth daily 30 tablet 3    hydrocortisone 2 5 % cream Apply topically 2 (two) times a day (Patient not taking: Reported on 5/18/2021) 453 g 0     No current facility-administered medications for this visit          Objective:    /74   Pulse 76   Temp 98 °F (36 7 °C)   Resp 20   Ht 5' 5 75" (1 67 m)   Wt 117 kg (259 lb)   LMP 04/13/2021 (Exact Date)   BMI 42 12 kg/m²        Physical Exam           STEFFI Gregorio

## 2021-05-25 ENCOUNTER — PATIENT MESSAGE (OUTPATIENT)
Dept: FAMILY MEDICINE CLINIC | Facility: CLINIC | Age: 51
End: 2021-05-25

## 2021-05-25 ENCOUNTER — OFFICE VISIT (OUTPATIENT)
Dept: PHYSICAL THERAPY | Facility: CLINIC | Age: 51
End: 2021-05-25
Payer: COMMERCIAL

## 2021-05-25 DIAGNOSIS — M54.12 CERVICAL RADICULOPATHY: ICD-10-CM

## 2021-05-25 DIAGNOSIS — M50.120 CERVICAL DISC DISORDER WITH RADICULOPATHY OF MID-CERVICAL REGION: ICD-10-CM

## 2021-05-25 DIAGNOSIS — G89.4 CHRONIC PAIN SYNDROME: Primary | ICD-10-CM

## 2021-05-25 DIAGNOSIS — M54.2 NECK PAIN: ICD-10-CM

## 2021-05-25 DIAGNOSIS — M48.02 CERVICAL SPINAL STENOSIS: ICD-10-CM

## 2021-05-25 DIAGNOSIS — I10 ESSENTIAL HYPERTENSION: ICD-10-CM

## 2021-05-25 LAB
CYTOLOGIST CVX/VAG CYTO: NORMAL
DX ICD CODE: NORMAL
HPV I/H RISK 4 DNA CVX QL PROBE+SIG AMP: NEGATIVE
OTHER STN SPEC: NORMAL
PATH REPORT.FINAL DX SPEC: NORMAL
SL AMB NOTE:: NORMAL
SL AMB SPECIMEN ADEQUACY: NORMAL
SL AMB TEST METHODOLOGY: NORMAL

## 2021-05-25 PROCEDURE — 97140 MANUAL THERAPY 1/> REGIONS: CPT

## 2021-05-25 PROCEDURE — 97110 THERAPEUTIC EXERCISES: CPT

## 2021-05-25 RX ORDER — METHOCARBAMOL 750 MG/1
750 TABLET, FILM COATED ORAL 2 TIMES DAILY
Qty: 60 TABLET | Refills: 0 | Status: SHIPPED | OUTPATIENT
Start: 2021-05-25

## 2021-05-25 RX ORDER — VALSARTAN 160 MG/1
160 TABLET ORAL DAILY
Qty: 90 TABLET | Refills: 1 | Status: SHIPPED | OUTPATIENT
Start: 2021-05-25

## 2021-05-25 NOTE — PROGRESS NOTES
Daily Note     Today's date: 2021  Patient name: Roxann Peck  : 1970  MRN: [id number]  Referring provider: Neela Elder MD  Dx:   Encounter Diagnosis     ICD-10-CM    1  Chronic pain syndrome  G89 4    2  Neck pain  M54 2    3  Cervical disc disorder with radiculopathy of mid-cervical region  M50 120    4  Cervical spinal stenosis  M48 02        Start Time: 0800  Stop Time: 0840  Total time in clinic (min): 40 minutes    Subjective: Patient reports 7/10 cervical spine pain today  She reports having no pain in her neck over the course of the last several days  This morning at work, she notes that she was having increased pain after moving a box of foil on the top shelf  This pain is her familiar pain  Objective: See treatment diary below      Assessment: Tolerated treatment well  Patient's pain abolished with repeated retraction in seated, followed by sustained cervical protrusion  Posture education when stacking shelves provided due to recent flare up of pain  Able to demonstrate proper posture with verbal cueing  Patient would benefit from continued PT      Plan: Continue per plan of care        Precautions: Standard, Essential HTN, work related injury 2021      Manuals 21    Cervical distraction  3' 3' 5'    L UT stretch    3' 5'    Nerve glides                Neuro Re-Ed        Scap retract   20 20 RTB 20 GTB                    Ther Ex        Repeated cervical retraction supine 10 10 2x10 2x10    L UT stretch    3x10s 3x10s    TB row, shoulder ext   20 RTB 20 RTB    Horizontal abd   20 RTB 20 RTB    Sustained protrusion  2x2'  2x2'    L Levator stretch   3x10s 3x10s                            Modalities

## 2021-05-25 NOTE — TELEPHONE ENCOUNTER
----- Message from Quin Pérez sent at 5/25/2021 12:32 PM EDT -----  Regarding: Prescription Question  Contact: [phone]  I did put in for a refill request but the Terbinafine wasn't listed to request, if you could please send that as well    Thank you, have a great day!

## 2021-05-27 ENCOUNTER — TELEPHONE (OUTPATIENT)
Dept: PHYSICAL THERAPY | Facility: CLINIC | Age: 51
End: 2021-05-27

## 2021-05-27 NOTE — TELEPHONE ENCOUNTER
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

## 2021-06-01 NOTE — NURSING NOTE
Call placed to patient to discuss upcoming appointment at [facility] radiology department and complete consultation with patient  Patient is having an US guided thyroid biopsy  Reviewed patient's allergies, no current anticoagulant medication present , patient had no question regarding procedure   Patient stated   " I know where I am going "

## 2021-06-02 ENCOUNTER — HOSPITAL ENCOUNTER (OUTPATIENT)
Dept: RADIOLOGY | Facility: HOSPITAL | Age: 51
Discharge: HOME | End: 2021-06-02
Admitting: RADIOLOGY
Payer: COMMERCIAL

## 2021-06-02 ENCOUNTER — TRANSCRIBE ORDERS (OUTPATIENT)
Dept: RADIOLOGY | Facility: HOSPITAL | Age: 51
End: 2021-06-02

## 2021-06-02 DIAGNOSIS — E04.2 MULTIPLE THYROID NODULES: ICD-10-CM

## 2021-06-02 PROCEDURE — 10005 FNA BX W/US GDN 1ST LES: CPT

## 2021-06-02 PROCEDURE — 88173 CYTOPATH EVAL FNA REPORT: CPT | Performed by: PATHOLOGY

## 2021-06-02 RX ORDER — LIDOCAINE HYDROCHLORIDE 10 MG/ML
5 INJECTION, SOLUTION EPIDURAL; INFILTRATION; INTRACAUDAL; PERINEURAL ONCE
Status: COMPLETED | OUTPATIENT
Start: 2021-06-02 | End: 2021-06-02

## 2021-06-02 RX ADMIN — LIDOCAINE HYDROCHLORIDE 5 ML: 10 INJECTION, SOLUTION EPIDURAL; INFILTRATION; INTRACAUDAL; PERINEURAL at 11:15

## 2021-06-03 ENCOUNTER — PATIENT MESSAGE (OUTPATIENT)
Dept: FAMILY MEDICINE CLINIC | Facility: CLINIC | Age: 51
End: 2021-06-03

## 2021-06-03 DIAGNOSIS — B35.1 ONYCHOMYCOSIS: Primary | ICD-10-CM

## 2021-06-03 RX ORDER — TERBINAFINE HYDROCHLORIDE 250 MG/1
250 TABLET ORAL DAILY
Qty: 30 TABLET | Refills: 0 | Status: SHIPPED | OUTPATIENT
Start: 2021-06-03 | End: 2021-07-03

## 2021-06-03 NOTE — TELEPHONE ENCOUNTER
From: Hardik Cunningham  To: STEFFI Chowdhury  Sent: 6/3/2021 3:44 PM EDT  Subject: Prescription Question      I just went to the pharmacy and the Terbinafine was not received by the pharmacy could you please send it again  Thank you       ----- Message -----   From:STEFFI Yee   Sent:5/25/2021 2:20 PM EDT   To:Ann Marie Phan   Subject:RE: Prescription Question    Hi Ananth Rodriguez,  I sent the refills for you  The pharmacy should have the refill on file for Terbinafine, I sent enough for the entire 3 months when I initially prescribed it  Take care! Oma Tapia      ----- Message -----   From:Ann Marie Phan   Sent:5/25/2021 12:32 PM EDT   To:STEFFI Yee   Subject:Prescription Question    I did put in for a refill request but the Terbinafine wasn't listed to request, if you could please send that as well    Thank you, have a great day!

## 2021-06-17 NOTE — PROGRESS NOTES
Patient is to be discharged from formal PT intervention, as she would require new prescription for continuation of care

## (undated) DEVICE — TOWEL SET X-RAY

## (undated) DEVICE — NEEDLE BLUNT 18 G X 1 1/2 W FILTER

## (undated) DEVICE — SYRINGE 5ML LL

## (undated) DEVICE — TRAY EPIDURAL PERIFIX 20GA X 3.5IN TUOHY 8ML

## (undated) DEVICE — RADIOLOGY STERILE LABELS: Brand: CENTURION

## (undated) DEVICE — GLOVE SRG BIOGEL 7.5

## (undated) DEVICE — PLASTIC ADHESIVE BANDAGE: Brand: CURITY

## (undated) DEVICE — CHLORAPREP APPLICATOR TINTED 10.5ML ONE-STEP

## (undated) DEVICE — BRACHIAL PLEXUS SET

## (undated) DEVICE — SMALL NEEDLE COUNTER NEST